# Patient Record
Sex: MALE | Race: BLACK OR AFRICAN AMERICAN | Employment: FULL TIME | URBAN - METROPOLITAN AREA
[De-identification: names, ages, dates, MRNs, and addresses within clinical notes are randomized per-mention and may not be internally consistent; named-entity substitution may affect disease eponyms.]

---

## 2024-11-10 ENCOUNTER — HOSPITAL ENCOUNTER (EMERGENCY)
Facility: HOSPITAL | Age: 34
Discharge: HOME | End: 2024-11-10

## 2024-11-10 VITALS
BODY MASS INDEX: 21.7 KG/M2 | HEART RATE: 86 BPM | HEIGHT: 71 IN | WEIGHT: 155 LBS | OXYGEN SATURATION: 100 % | DIASTOLIC BLOOD PRESSURE: 82 MMHG | TEMPERATURE: 99.1 F | SYSTOLIC BLOOD PRESSURE: 130 MMHG | RESPIRATION RATE: 18 BRPM

## 2024-11-10 DIAGNOSIS — L84 CALLUS OF TOE: Primary | ICD-10-CM

## 2024-11-10 PROCEDURE — 99282 EMERGENCY DEPT VISIT SF MDM: CPT

## 2024-11-10 ASSESSMENT — PAIN DESCRIPTION - DESCRIPTORS: DESCRIPTORS: SHARP

## 2024-11-10 ASSESSMENT — PAIN DESCRIPTION - PAIN TYPE: TYPE: ACUTE PAIN

## 2024-11-10 ASSESSMENT — PAIN SCALES - GENERAL: PAINLEVEL_OUTOF10: 10

## 2024-11-10 ASSESSMENT — PAIN DESCRIPTION - LOCATION: LOCATION: TOE (COMMENT WHICH ONE)

## 2024-11-10 ASSESSMENT — PAIN - FUNCTIONAL ASSESSMENT: PAIN_FUNCTIONAL_ASSESSMENT: 0-10

## 2024-11-10 ASSESSMENT — PAIN DESCRIPTION - ORIENTATION: ORIENTATION: LEFT

## 2024-11-10 NOTE — ED PROVIDER NOTES
[facility] EMERGENCY DEPT  EMERGENCY DEPARTMENT ENCOUNTER       Pt Name: Mp Yost  MRN: [id number]  Birthdate 1990  Date of evaluation: 11/10/2024  Provider: Flory Butts PA-C   PCP: No primary care provider on file.  Note Started: 2:59 PM EST 11/10/24     CHIEF COMPLAINT       Chief Complaint   Patient presents with    Toe Pain     Per pt reports left 5th toe pain x 1 month, +limited ROM, denies recent injury.         HISTORY OF PRESENT ILLNESS: 1 or more elements      History From: Patient  HPI Limitations: None     Mp Yost is a 34 y.o. male who presents complaining of pain to left pinky toe x 1 month.  Patient reports that he has pain over the medial dorsal aspect of his left pinky toe, reports that his fourth digit is rubbing on top of this toe causing him pain.  Reports he bought new tennis shoes to try to alleviate this however did not help.  He denies any fever, chills, nausea, vomiting, toe swelling, toe erythema, warmth, open blister, open wound, purulent drainage from toe, toe numbness.  Denies any traumatic injury recent fall.     Nursing Notes were all reviewed and agreed with or any disagreements were addressed in the HPI.     REVIEW OF SYSTEMS      Review of Systems     Positives and Pertinent negatives as per HPI.    PAST HISTORY     Past Medical History:  History reviewed. No pertinent past medical history.    Past Surgical History:  History reviewed. No pertinent surgical history.    Family History:  History reviewed. No pertinent family history.    Social History:       Allergies:  No Known Allergies    CURRENT MEDICATIONS    There are no discharge medications for this patient.      SCREENINGS               No data recorded        PHYSICAL EXAM      ED Triage Vitals [11/10/24 1237]   Encounter Vitals Group      /82      Systolic BP Percentile       Diastolic BP Percentile       Pulse 86      Respirations 18      Temp 99.1 °F (37.3 °C)      Temp Source Tympanic      SpO2  [email or web address]

## 2024-11-10 NOTE — ED NOTES
Discharge instructions were given to the patient by Liz Feliz RN.  The patient left the Emergency Department alert and oriented and in no acute distress with 0 prescription(s). The patient was encouraged to call or return to the ED for worsening issues or problems and was encouraged to schedule a follow up appointment for continuing care.  The patient verbalized understanding of discharge instructions and prescriptions; all questions were answered. The patient has no further concerns at this time.

## 2024-11-10 NOTE — ED NOTES
Pt presents ambulatory to ED complaining of left 5th toe pain and swelling x1 mo. Pt denies injury. Pt is alert and oriented x 4, RR even and unlabored, skin is warm and dry. Assesment completed and pt updated on plan of care.       Emergency Department Nursing Plan of Care       The Nursing Plan of Care is developed from the Nursing assessment and Emergency Department Attending provider initial evaluation.  The plan of care may be reviewed in the “ED Provider note”.    The Plan of Care was developed with the following considerations:   Patient / Family readiness to learn indicated by:verbalized understanding  Persons(s) to be included in education: patient  Barriers to Learning/Limitations:None    Signed     Liz Feliz RN    11/10/2024   1:55 PM